# Patient Record
Sex: FEMALE | Race: WHITE
[De-identification: names, ages, dates, MRNs, and addresses within clinical notes are randomized per-mention and may not be internally consistent; named-entity substitution may affect disease eponyms.]

---

## 2017-03-15 ENCOUNTER — HOSPITAL ENCOUNTER (OUTPATIENT)
Age: 62
Discharge: HOME | End: 2017-03-15
Payer: COMMERCIAL

## 2017-03-15 DIAGNOSIS — I10: Primary | ICD-10-CM

## 2017-03-15 DIAGNOSIS — E78.5: ICD-10-CM

## 2017-07-19 ENCOUNTER — HOSPITAL ENCOUNTER (EMERGENCY)
Dept: HOSPITAL 76 - ED | Age: 62
LOS: 1 days | Discharge: HOME | End: 2017-07-20
Payer: COMMERCIAL

## 2017-07-19 DIAGNOSIS — M06.9: ICD-10-CM

## 2017-07-19 DIAGNOSIS — I10: Primary | ICD-10-CM

## 2017-07-19 DIAGNOSIS — G89.18: ICD-10-CM

## 2017-07-19 DIAGNOSIS — G47.30: ICD-10-CM

## 2017-07-19 DIAGNOSIS — K21.9: ICD-10-CM

## 2017-07-19 DIAGNOSIS — Z79.82: ICD-10-CM

## 2017-07-19 PROCEDURE — 99283 EMERGENCY DEPT VISIT LOW MDM: CPT

## 2017-07-19 PROCEDURE — 96372 THER/PROPH/DIAG INJ SC/IM: CPT

## 2017-07-19 NOTE — ED PHYSICIAN DOCUMENTATION
History of Present Illness





- Stated complaint


Stated Complaint: HIGH BLOOD PRESSURE





- Chief complaint


Chief Complaint: General





- History obtained from


History obtained from: Patient





- History of Present Illness


Timing: Today


Pain level now: 7


Improved by: mild, but insufficient, improvement with vicodin, last dose 6:30 

PM tonight.


Worsened by: no apparent exacerbating factors





- Additonal information


Additional information: 





c/o facial pain associated with blepheroplasty and face lift 2 days ago, pain 

has been tenuously controlled with vicodin, at times not adequately controlled 

despite this medication. Tonight, she was having pain that remained 7 (out of 10

) after taking the vicodin. She then took her blood pressure and found it to be 

high, so she took a half-dose of her losartan (in addition to her regular full 

dose earlier tonight). 





Review of Systems


Constitutional: denies: Fever


Eyes: reports: Other (blurry vision; she was told by her surgeon that this is 

not unusual considering the procedure she had)


Cardiac: reports: Reviewed and negative


Respiratory: reports: Reviewed and negative


GI: denies: Abdominal Pain, Nausea, Vomiting


Neurologic: denies: Generalized weakness, Focal weakness, Numbness, Altered 

mental status, Headache (face pain, but not a headache per se)





PD PAST MEDICAL HISTORY





- Past Medical History


Respiratory: Sleep apnea


GI: GERD


Psych: Bipolar disorder


Musculoskeletal: Rheumatoid arthritis





- Past Surgical History


Past Surgical History: Yes


/GYN:  section





- Present Medications


Home Medications: 


 Ambulatory Orders











 Medication  Instructions  Recorded  Confirmed


 


Acyclovir 400 mg ORAL BID 14


 


Aspirin/Acetaminophen/Caffeine 1 tab ORAL DAILY PRN 14





[Excedrin Extra Strength Caplet]   


 


Biotin 10,000 mcg ORAL QPM 14


 


Bupropion HCl [Bupropion Xl] 300 mg ORAL DAILY 14


 


Cholecalciferol (Vitamin D3) 2,000 unit ORAL DAILY 14





[Vitamin D-3]   


 


Clonazepam 0.25 mg ORAL QPM 14


 


FLUoxetine [PROzac] 40 mg ORAL DAILY 14


 


Folic Acid 1 mg ORAL DAILY 14


 


Lamotrigine [Lamictal] 200 mg ORAL QPM 14


 


Methotrexate 15 mg ORAL TITR 14


 


Omeprazole 20 mg ORAL BID 14


 


Vitamin B Complex Vit C No.4 1 tab ORAL QPM 14





[Super B Complex]   


 


HYDROcod/ACETAM 5/325 [Norco 5/325] 1 - 2 tab PO Q6H PRN 17


 


Losartan [Cozaar] 50 mg PO DAILY 17


 


oxyCODONE/ACET 5/325 [Percocet 5 1 - 2 each PO Q6H PRN #14 tablet 17 





mg/325 mg]   














- Allergies


Allergies/Adverse Reactions: 


 Allergies











Allergy/AdvReac Type Severity Reaction Status Date / Time


 


codeine Allergy  Hives Verified 17 23:19


 


lisinopril Allergy  Unknown Verified 17 23:19


 


meperidine HCl * Allergy  Hives Verified 17 23:19





[From Demerol]     


 


serazol Allergy  Hives Uncoded 14 18:25














- Social History


Does the pt smoke?: No


Smoking Status: Never smoker


Does the pt drink ETOH?: No


Does the pt have substance abuse?: No





PD ED PE NORMAL





- Vitals


Vital signs reviewed: Yes





- General


General: Alert and oriented X 3, No acute distress, Well developed/nourished





- HEENT


HEENT: PERRL, EOMI, Other (incision sites (chin, bilateral ears) are C/D/I 

without erythema or discharge)





- Neck


Neck: Supple, no meningeal sign





- Abdomen


Abdomen: Soft, Non tender





- Derm


Derm: Normal color, Warm and dry, No rash





- Neuro


Neuro: Alert and oriented X 3, CNs 2-12 intact, Normal speech





Results





- Vitals


Vitals: 


 Vital Signs - 24 hr











  17





  23:04 01:10


 


Temperature 36.5 C 


 


Heart Rate 106 H 84


 


Respiratory 18 16





Rate  


 


Blood Pressure 186/111 H 161/95 H


 


O2 Saturation 96 96








 Oxygen











O2 Source                      Room air

















PD MEDICAL DECISION MAKING





- ED course


Complexity details: re-evaluated patient, considered differential, d/w patient


ED course: 





Given 1mg IM dilaudid which patient reported, on reevaluation, provided relief 

of her post-operative (facial) pain, but she subsequently developed epigastric 

discomfort. On reexamination, she had no abdominal tenderness to palpation but 

she did appear to be having painful discomfort. Given bentyl PO and oxycodone 

PO. On subsequent reevaluation, she reported feeling much better and was 

comfortable being discharged home.





Departure





- Departure


Disposition: 01 Home, Self Care


Clinical Impression: 


 Post-operative pain, Hypertension


Condition: Good


Instructions:  ED Hypertension Conf Out Of Control, ED Post Op Pain


Follow-Up: 


Teresa Gomez MD [Primary Care Provider] - 


Prescriptions: 


oxyCODONE/ACET 5/325 [Percocet 5 mg/325 mg] 1 - 2 each PO Q6H PRN #14 tablet


 PRN Reason: Pain


Discharge Date/Time: 17 01:26

## 2017-07-20 VITALS — SYSTOLIC BLOOD PRESSURE: 161 MMHG | DIASTOLIC BLOOD PRESSURE: 95 MMHG

## 2018-03-08 ENCOUNTER — HOSPITAL ENCOUNTER (OUTPATIENT)
Dept: HOSPITAL 76 - LAB | Age: 63
Discharge: HOME | End: 2018-03-08
Attending: FAMILY MEDICINE
Payer: COMMERCIAL

## 2018-03-08 DIAGNOSIS — I10: Primary | ICD-10-CM

## 2018-03-08 LAB
ANION GAP SERPL CALCULATED.4IONS-SCNC: 10 MMOL/L (ref 6–13)
BUN SERPL-MCNC: 19 MG/DL (ref 6–20)
CALCIUM UR-MCNC: 9.5 MG/DL (ref 8.5–10.3)
CHLORIDE SERPL-SCNC: 100 MMOL/L (ref 101–111)
CHOLEST SERPL-MCNC: 252 MG/DL
CO2 SERPL-SCNC: 27 MMOL/L (ref 21–32)
CREAT SERPLBLD-SCNC: 0.7 MG/DL (ref 0.4–1)
GFRSERPLBLD MDRD-ARVRAT: 84 ML/MIN/{1.73_M2} (ref 89–?)
GLUCOSE SERPL-MCNC: 102 MG/DL (ref 70–100)
HDLC SERPL-MCNC: 76 MG/DL
HDLC SERPL: 3.3 {RATIO} (ref ?–4.4)
LDLC SERPL CALC-MCNC: 159 MG/DL
LDLC/HDLC SERPL: 2.1 {RATIO} (ref ?–4.4)
SODIUM SERPLBLD-SCNC: 137 MMOL/L (ref 135–145)
VLDLC SERPL-SCNC: 17 MG/DL

## 2018-03-08 PROCEDURE — 80048 BASIC METABOLIC PNL TOTAL CA: CPT

## 2018-03-08 PROCEDURE — 83721 ASSAY OF BLOOD LIPOPROTEIN: CPT

## 2018-03-08 PROCEDURE — 80061 LIPID PANEL: CPT

## 2018-03-08 PROCEDURE — 36415 COLL VENOUS BLD VENIPUNCTURE: CPT

## 2019-04-17 ENCOUNTER — HOSPITAL ENCOUNTER (OUTPATIENT)
Dept: HOSPITAL 76 - LAB | Age: 64
Discharge: HOME | End: 2019-04-17
Attending: FAMILY MEDICINE
Payer: COMMERCIAL

## 2019-04-17 DIAGNOSIS — E78.5: Primary | ICD-10-CM

## 2019-04-17 DIAGNOSIS — I10: ICD-10-CM

## 2019-04-17 DIAGNOSIS — R93.89: ICD-10-CM

## 2019-04-17 LAB
ANION GAP SERPL CALCULATED.4IONS-SCNC: 9 MMOL/L (ref 6–13)
BUN SERPL-MCNC: 25 MG/DL (ref 6–20)
CALCIUM UR-MCNC: 10.1 MG/DL (ref 8.5–10.3)
CHLORIDE SERPL-SCNC: 103 MMOL/L (ref 101–111)
CHOLEST SERPL-MCNC: 243 MG/DL
CO2 SERPL-SCNC: 26 MMOL/L (ref 21–32)
CREAT SERPLBLD-SCNC: 0.8 MG/DL (ref 0.4–1)
GFRSERPLBLD MDRD-ARVRAT: 72 ML/MIN/{1.73_M2} (ref 89–?)
GLUCOSE SERPL-MCNC: 84 MG/DL (ref 70–100)
HDLC SERPL-MCNC: 105 MG/DL
HDLC SERPL: 2.3 {RATIO} (ref ?–4.4)
LDLC SERPL CALC-MCNC: 126 MG/DL
LDLC/HDLC SERPL: 1.2 {RATIO} (ref ?–4.4)
SODIUM SERPLBLD-SCNC: 138 MMOL/L (ref 135–145)
VLDLC SERPL-SCNC: 12 MG/DL

## 2019-04-17 PROCEDURE — 36415 COLL VENOUS BLD VENIPUNCTURE: CPT

## 2019-04-17 PROCEDURE — 80048 BASIC METABOLIC PNL TOTAL CA: CPT

## 2019-04-17 PROCEDURE — 83721 ASSAY OF BLOOD LIPOPROTEIN: CPT

## 2019-04-17 PROCEDURE — 84443 ASSAY THYROID STIM HORMONE: CPT

## 2019-04-17 PROCEDURE — 80061 LIPID PANEL: CPT

## 2019-04-17 PROCEDURE — 84481 FREE ASSAY (FT-3): CPT

## 2020-01-28 ENCOUNTER — HOSPITAL ENCOUNTER (OUTPATIENT)
Dept: HOSPITAL 76 - LAB | Age: 65
Discharge: HOME | End: 2020-01-28
Attending: INTERNAL MEDICINE
Payer: COMMERCIAL

## 2020-01-28 DIAGNOSIS — M05.9: Primary | ICD-10-CM

## 2020-01-28 DIAGNOSIS — Z79.899: ICD-10-CM

## 2020-01-28 LAB
ALBUMIN DIAFP-MCNC: 4.2 G/DL (ref 3.2–5.5)
ALBUMIN/GLOB SERPL: 1.2 {RATIO} (ref 1–2.2)
ALP SERPL-CCNC: 62 IU/L (ref 42–121)
ALT SERPL W P-5'-P-CCNC: 42 IU/L (ref 10–60)
ANION GAP SERPL CALCULATED.4IONS-SCNC: 11 MMOL/L (ref 6–13)
AST SERPL W P-5'-P-CCNC: 34 IU/L (ref 10–42)
BASOPHILS NFR BLD AUTO: 0.1 10^3/UL (ref 0–0.1)
BASOPHILS NFR BLD AUTO: 0.8 %
BILIRUB BLD-MCNC: 0.7 MG/DL (ref 0.2–1)
BUN SERPL-MCNC: 19 MG/DL (ref 6–20)
CALCIUM UR-MCNC: 9.5 MG/DL (ref 8.5–10.3)
CHLORIDE SERPL-SCNC: 101 MMOL/L (ref 101–111)
CO2 SERPL-SCNC: 26 MMOL/L (ref 21–32)
CREAT SERPLBLD-SCNC: 0.9 MG/DL (ref 0.4–1)
CRP SERPL-MCNC: < 1 MG/DL (ref 0–1)
EOSINOPHIL # BLD AUTO: 0.1 10^3/UL (ref 0–0.7)
EOSINOPHIL NFR BLD AUTO: 1.6 %
ERYTHROCYTE [DISTWIDTH] IN BLOOD BY AUTOMATED COUNT: 13.3 % (ref 12–15)
GFRSERPLBLD MDRD-ARVRAT: 63 ML/MIN/{1.73_M2} (ref 89–?)
GLOBULIN SER-MCNC: 3.4 G/DL (ref 2.1–4.2)
GLUCOSE SERPL-MCNC: 97 MG/DL (ref 70–100)
HGB UR QL STRIP: 13 G/DL (ref 12–16)
LYMPHOCYTES # SPEC AUTO: 2.4 10^3/UL (ref 1.5–3.5)
LYMPHOCYTES NFR BLD AUTO: 29.4 %
MCH RBC QN AUTO: 30.6 PG (ref 27–31)
MCHC RBC AUTO-ENTMCNC: 32.5 G/DL (ref 32–36)
MCV RBC AUTO: 94.1 FL (ref 81–99)
MONOCYTES # BLD AUTO: 0.6 10^3/UL (ref 0–1)
MONOCYTES NFR BLD AUTO: 7.6 %
NEUTROPHILS # BLD AUTO: 4.8 10^3/UL (ref 1.5–6.6)
NEUTROPHILS # SNV AUTO: 8 X10^3/UL (ref 4.8–10.8)
NEUTROPHILS NFR BLD AUTO: 60.2 %
PDW BLD AUTO: 8.9 FL (ref 7.9–10.8)
PLATELET # BLD: 282 10^3/UL (ref 130–450)
PROT SPEC-MCNC: 7.6 G/DL (ref 6.7–8.2)
RBC MAR: 4.25 10^6/UL (ref 4.2–5.4)
SODIUM SERPLBLD-SCNC: 138 MMOL/L (ref 135–145)

## 2020-01-28 PROCEDURE — 86140 C-REACTIVE PROTEIN: CPT

## 2020-01-28 PROCEDURE — 36415 COLL VENOUS BLD VENIPUNCTURE: CPT

## 2020-01-28 PROCEDURE — 85025 COMPLETE CBC W/AUTO DIFF WBC: CPT

## 2020-01-28 PROCEDURE — 80053 COMPREHEN METABOLIC PANEL: CPT

## 2020-06-11 ENCOUNTER — HOSPITAL ENCOUNTER (EMERGENCY)
Dept: HOSPITAL 76 - ED | Age: 65
Discharge: HOME | End: 2020-06-11
Payer: COMMERCIAL

## 2020-06-11 VITALS — DIASTOLIC BLOOD PRESSURE: 73 MMHG | SYSTOLIC BLOOD PRESSURE: 146 MMHG

## 2020-06-11 DIAGNOSIS — M79.10: ICD-10-CM

## 2020-06-11 DIAGNOSIS — Z20.828: ICD-10-CM

## 2020-06-11 DIAGNOSIS — R50.9: Primary | ICD-10-CM

## 2020-06-11 DIAGNOSIS — M06.9: ICD-10-CM

## 2020-06-11 DIAGNOSIS — J84.10: ICD-10-CM

## 2020-06-11 DIAGNOSIS — Z79.82: ICD-10-CM

## 2020-06-11 DIAGNOSIS — I10: ICD-10-CM

## 2020-06-11 DIAGNOSIS — R00.0: ICD-10-CM

## 2020-06-11 LAB
ALBUMIN DIAFP-MCNC: 4 G/DL (ref 3.2–5.5)
ALBUMIN/GLOB SERPL: 1.1 {RATIO} (ref 1–2.2)
ALP SERPL-CCNC: 72 IU/L (ref 42–121)
ALT SERPL W P-5'-P-CCNC: 23 IU/L (ref 10–60)
ANION GAP SERPL CALCULATED.4IONS-SCNC: 9 MMOL/L (ref 6–13)
AST SERPL W P-5'-P-CCNC: 21 IU/L (ref 10–42)
BASOPHILS NFR BLD AUTO: 0.1 10^3/UL (ref 0–0.1)
BASOPHILS NFR BLD AUTO: 0.5 %
BILIRUB BLD-MCNC: 0.8 MG/DL (ref 0.2–1)
BUN SERPL-MCNC: 22 MG/DL (ref 6–20)
CALCIUM UR-MCNC: 9.7 MG/DL (ref 8.5–10.3)
CHLORIDE SERPL-SCNC: 101 MMOL/L (ref 101–111)
CLARITY UR REFRACT.AUTO: CLEAR
CO2 SERPL-SCNC: 25 MMOL/L (ref 21–32)
CREAT SERPLBLD-SCNC: 0.8 MG/DL (ref 0.4–1)
EOSINOPHIL # BLD AUTO: 0 10^3/UL (ref 0–0.7)
EOSINOPHIL NFR BLD AUTO: 0.1 %
ERYTHROCYTE [DISTWIDTH] IN BLOOD BY AUTOMATED COUNT: 13.5 % (ref 12–15)
GLOBULIN SER-MCNC: 3.6 G/DL (ref 2.1–4.2)
GLUCOSE SERPL-MCNC: 116 MG/DL (ref 70–100)
GLUCOSE UR QL STRIP.AUTO: NEGATIVE MG/DL
HGB UR QL STRIP: 12.2 G/DL (ref 12–16)
KETONES UR QL STRIP.AUTO: NEGATIVE MG/DL
LIPASE SERPL-CCNC: 32 U/L (ref 22–51)
LYMPHOCYTES # SPEC AUTO: 2.1 10^3/UL (ref 1.5–3.5)
LYMPHOCYTES NFR BLD AUTO: 17.6 %
MCH RBC QN AUTO: 31.3 PG (ref 27–31)
MCHC RBC AUTO-ENTMCNC: 33.5 G/DL (ref 32–36)
MCV RBC AUTO: 93.3 FL (ref 81–99)
MONOCYTES # BLD AUTO: 1.2 10^3/UL (ref 0–1)
MONOCYTES NFR BLD AUTO: 10.5 %
NEUTROPHILS # BLD AUTO: 8.3 10^3/UL (ref 1.5–6.6)
NEUTROPHILS # SNV AUTO: 11.7 X10^3/UL (ref 4.8–10.8)
NEUTROPHILS NFR BLD AUTO: 70.9 %
NITRITE UR QL STRIP.AUTO: NEGATIVE
PDW BLD AUTO: 9.4 FL (ref 7.9–10.8)
PH UR STRIP.AUTO: 8.5 PH (ref 5–7.5)
PLATELET # BLD: 285 10^3/UL (ref 130–450)
PROT SPEC-MCNC: 7.6 G/DL (ref 6.7–8.2)
PROT UR STRIP.AUTO-MCNC: NEGATIVE MG/DL
RBC # UR STRIP.AUTO: NEGATIVE /UL
RBC MAR: 3.9 10^6/UL (ref 4.2–5.4)
SODIUM SERPLBLD-SCNC: 135 MMOL/L (ref 135–145)
SP GR UR STRIP.AUTO: 1.01 (ref 1–1.03)
UROBILINOGEN UR QL STRIP.AUTO: (no result) E.U./DL
UROBILINOGEN UR STRIP.AUTO-MCNC: NEGATIVE MG/DL

## 2020-06-11 PROCEDURE — 99283 EMERGENCY DEPT VISIT LOW MDM: CPT

## 2020-06-11 PROCEDURE — 36415 COLL VENOUS BLD VENIPUNCTURE: CPT

## 2020-06-11 PROCEDURE — 85025 COMPLETE CBC W/AUTO DIFF WBC: CPT

## 2020-06-11 PROCEDURE — 84443 ASSAY THYROID STIM HORMONE: CPT

## 2020-06-11 PROCEDURE — 96360 HYDRATION IV INFUSION INIT: CPT

## 2020-06-11 PROCEDURE — 84484 ASSAY OF TROPONIN QUANT: CPT

## 2020-06-11 PROCEDURE — 83605 ASSAY OF LACTIC ACID: CPT

## 2020-06-11 PROCEDURE — 83690 ASSAY OF LIPASE: CPT

## 2020-06-11 PROCEDURE — 81001 URINALYSIS AUTO W/SCOPE: CPT

## 2020-06-11 PROCEDURE — 87040 BLOOD CULTURE FOR BACTERIA: CPT

## 2020-06-11 PROCEDURE — 80053 COMPREHEN METABOLIC PANEL: CPT

## 2020-06-11 PROCEDURE — 87086 URINE CULTURE/COLONY COUNT: CPT

## 2020-06-11 PROCEDURE — 81003 URINALYSIS AUTO W/O SCOPE: CPT

## 2020-06-11 PROCEDURE — 71045 X-RAY EXAM CHEST 1 VIEW: CPT

## 2020-06-11 PROCEDURE — 81599 UNLISTED MAAA: CPT

## 2020-06-11 PROCEDURE — 93005 ELECTROCARDIOGRAM TRACING: CPT

## 2020-06-11 NOTE — XRAY REPORT
PROCEDURE:  Chest 1 View X-Ray

 

INDICATIONS:  chest pain

 

TECHNIQUE:  One view of the chest was acquired.  

 

COMPARISON:  12/31/2014

 

FINDINGS:  

 

Surgical changes and devices:  None.  

 

Lungs and pleura:  No pleural effusions or pneumothorax.  Lungs are clear.  

 

Mediastinum:  Mediastinal contours appear normal.  Heart size is normal.  

 

Bones and chest wall:  No suspicious bony lesions.  Overlying soft tissues appear unremarkable.  

 

IMPRESSION:  

No evidence acute pulmonary process.

 

Reviewed by: Billy Cannon MD on 6/11/2020 7:50 PM PDT

Approved by: Billy Cannon MD on 6/11/2020 7:50 PM PDT

 

 

Station ID:  [id number]

## 2020-06-11 NOTE — ED PHYSICIAN DOCUMENTATION
History of Present Illness





- Stated complaint


Stated Complaint: CHILLS, DIZZY, FEVER





- Chief complaint


Chief Complaint: Fever





- History obtained from


History obtained from: Patient, Family





- History of Present Illness


Timing: Prior to arrival, How many hours ago (5)





- Additonal information


Additional information: 





64-year-old female presents to the emergency department with acute onset fevers 

chills and a sensation of feeling dizzy or light headed.  Began about 5 hours 

ago.  She reports a chronic cough secondary to her pulmonary fibrosis (secondary

to RA) but no change in quality of cough.  Her cough is not productive.





She denies any headache any focal weakness.  She denies abdominal pain, dysuria,

urgency, or frequency. no vomiting or diarrhea. No rashes sores or lesions. She 

is accompanied by her spouse who feels well but has traveled recently.





PMH:  HTN, RA, pulmonary fibrosis


MEDS:  prozac, wellbutrin, lamictil. losartan, simvastatin, methotrexate, 

simponi


SOC:  non smoker; denies ETOH





  





Review of Systems


Constitutional: reports: Fever, Chills.  denies: Fatigue, Weight Loss, Sweats


Eyes: denies: Loss of vision


Ears: denies: Loss of hearing


Nose: denies: Rhinorrhea / runny nose


Cardiac: denies: Chest pain / pressure, Palpitations, Pedal edema, Calf pain


Respiratory: reports: Cough (chronic).  denies: Dyspnea, Hemoptysis, Wheezing


GI: denies: Nausea, Vomiting


: denies: Dysuria


Skin: denies: Rash, Lesions


Musculoskeletal: denies: Neck pain, Back pain


Neurologic: denies: Generalized weakness, Focal weakness, Numbness, Difficulty 

speaking


Immunocompromised: reports: Immunocompromised (RA on methotrexate)





PD PAST MEDICAL HISTORY





- Past Medical History


Cardiovascular: Hypertension


Respiratory: Sleep apnea, Other (pulmonary fibrosis)


Endocrine/Autoimmune: None, Other (rheumatoid arthritis)


GI: GERD


Psych: Bipolar disorder


Musculoskeletal: Rheumatoid arthritis





- Past Surgical History


Past Surgical History: Yes


/GYN:  section





- Present Medications


Home Medications: 


                                Ambulatory Orders











 Medication  Instructions  Recorded  Confirmed


 


Acyclovir 400 mg ORAL BID 14


 


Aspirin/Acetaminophen/Caffeine 1 tab ORAL DAILY PRN 14





[Excedrin Extra Strength Caplet]   


 


Biotin 10,000 mcg ORAL QPM 14


 


Bupropion HCl [Bupropion Xl] 300 mg ORAL DAILY 14


 


Cholecalciferol (Vitamin D3) 2,000 unit ORAL DAILY 14





[Vitamin D-3]   


 


Clonazepam 0.25 mg ORAL QPM 14


 


FLUoxetine [PROzac] 40 mg ORAL DAILY 14


 


Folic Acid 1 mg ORAL DAILY 14


 


Methotrexate 15 mg ORAL TITR 14


 


Omeprazole 20 mg ORAL BID 14


 


Vitamin B Complex Vit C No.4 1 tab ORAL QPM 14





[Super B Complex]   


 


lamoTRIgine [Lamictal] 200 mg ORAL QPM 14


 


HYDROcod/ACETAM 5/325 [Norco 5/325] 1 - 2 tab PO Q6H PRN 17


 


Losartan [Cozaar] 50 mg PO DAILY 17


 


oxyCODONE/ACET 5/325 [Percocet 5 1 - 2 each PO Q6H PRN #14 tablet 17 





mg/325 mg]   














- Allergies


Allergies/Adverse Reactions: 


                                    Allergies











Allergy/AdvReac Type Severity Reaction Status Date / Time


 


codeine Allergy  Hives Verified 20 18:48


 


lisinopril Allergy  Unknown Verified 20 18:48


 


meperidine HCl * Allergy  Hives Verified 20 18:48





[From Demerol]     


 


serazol Allergy  Hives Uncoded 20 18:48














- Social History


Does the pt smoke?: No


Smoking Status: Never smoker


Does the pt drink ETOH?: No


Does the pt have substance abuse?: No





PD ED PE EXPANDED





- General


General: Alert, No acute distress, Well developed/nourished, Anxious





- HEENT


HEENT: Atraumatic, PERRL, EOMI





- Eyes


Eyes: PERRL





- Neck


Neck: Supple w/out meningeal sx, No tenderness.  No: Adenopathy





- Cardiac


Cardiac: Tachy, Normal pulses, Radial strong equal, Cap refill < 2 sec.  No: 

Murmur Present





- Respiratory


Respiratory: Clear to ausultation patricia.  No: Distress, Labored





- Abdomen


Abdomen: Normal Bowel sounds.  No: Tender to palpation





- Derm


Derm: Normal color, Warm and dry.  No: Purpura, Emboli, Bruising





- Neuro


Neuro: Alert and Oriented X 3, CNII-XII intact, Cerebellar nl, Normal gait.  No:

Weakness





- GCS


Eye Opening: Spontaneous


Motor: Obeys Commands


Verbal: Oriented


Total: 15





- Psych


Psych: Normal





Results





- Vitals


Vitals: 


                               Vital Signs - 24 hr











  20





  18:49 19:23 19:51


 


Temperature 38.0 C H 38.2 C H 


 


Heart Rate 129 H  100


 


Respiratory 16  21





Rate   


 


Blood Pressure 160/87 H  135/73 H


 


O2 Saturation 94  97








                                     Oxygen











O2 Source                      Room air

















- EKG (time done)


  ** 1926


Rate: Rate (enter#) (105)


Rhythm: Sinus tachycardia


Axis: Normal


Intervals: Normal TN


QRS: Normal


Ischemia: Non specific changes, Other (t wave flattening anterior leads)


Compare to prior EKG: Old EKG unavailable


Computer interpretation: Agree with computer





- Labs


Labs: 


                                Laboratory Tests











  20





  19:09 19:11 19:11


 


WBC   11.7 H 


 


RBC   3.90 L 


 


Hgb   12.2 


 


Hct   36.4 L 


 


MCV   93.3 


 


MCH   31.3 H 


 


MCHC   33.5 


 


RDW   13.5 


 


Plt Count   285 


 


MPV   9.4 


 


Neut # (Auto)   8.3 H 


 


Lymph # (Auto)   2.1 


 


Mono # (Auto)   1.2 H 


 


Eos # (Auto)   0.0 


 


Baso # (Auto)   0.1 


 


Absolute Nucleated RBC   0.00 


 


Nucleated RBC %   0.0 


 


Sodium    135


 


Potassium    3.8


 


Chloride    101


 


Carbon Dioxide    25


 


Anion Gap    9.0


 


BUN    22 H


 


Creatinine    0.8


 


Estimated GFR (MDRD)    72 L


 


Glucose    116 H


 


Lactic Acid   


 


Calcium    9.7


 


Total Bilirubin    0.8


 


AST    21


 


ALT    23


 


Alkaline Phosphatase    72


 


Troponin I High Sens   


 


Total Protein    7.6


 


Albumin    4.0


 


Globulin    3.6


 


Albumin/Globulin Ratio    1.1


 


Lipase    32


 


TSH   


 


Urine Color  YELLOW  


 


Urine Clarity  CLEAR  


 


Urine pH  8.5 H  


 


Ur Specific Gravity  1.015  


 


Urine Protein  NEGATIVE  


 


Urine Glucose (UA)  NEGATIVE  


 


Urine Ketones  NEGATIVE  


 


Urine Occult Blood  NEGATIVE  


 


Urine Nitrite  NEGATIVE  


 


Urine Bilirubin  NEGATIVE  


 


Urine Urobilinogen  0.2 (NORMAL)  


 


Ur Leukocyte Esterase  NEGATIVE  


 


Ur Microscopic Review  NOT INDICATED  


 


Urine Culture Comments  NOT INDICATED  














  20





  19:11 19:11 19:11


 


WBC   


 


RBC   


 


Hgb   


 


Hct   


 


MCV   


 


MCH   


 


MCHC   


 


RDW   


 


Plt Count   


 


MPV   


 


Neut # (Auto)   


 


Lymph # (Auto)   


 


Mono # (Auto)   


 


Eos # (Auto)   


 


Baso # (Auto)   


 


Absolute Nucleated RBC   


 


Nucleated RBC %   


 


Sodium   


 


Potassium   


 


Chloride   


 


Carbon Dioxide   


 


Anion Gap   


 


BUN   


 


Creatinine   


 


Estimated GFR (MDRD)   


 


Glucose   


 


Lactic Acid  0.7  


 


Calcium   


 


Total Bilirubin   


 


AST   


 


ALT   


 


Alkaline Phosphatase   


 


Troponin I High Sens    6.1


 


Total Protein   


 


Albumin   


 


Globulin   


 


Albumin/Globulin Ratio   


 


Lipase   


 


TSH   0.78 


 


Urine Color   


 


Urine Clarity   


 


Urine pH   


 


Ur Specific Gravity   


 


Urine Protein   


 


Urine Glucose (UA)   


 


Urine Ketones   


 


Urine Occult Blood   


 


Urine Nitrite   


 


Urine Bilirubin   


 


Urine Urobilinogen   


 


Ur Leukocyte Esterase   


 


Ur Microscopic Review   


 


Urine Culture Comments   














- Rads (name of study)


  ** cxr


Radiology: Final report received (No acute cardiopulmonary abnormality.)





PD MEDICAL DECISION MAKING





- ED course


Complexity details: reviewed results, re-evaluated patient, considered 

differential, d/w patient, d/w family


ED course: 





This is a 64-year-old female who presents to the emergency department with acute

onset of myalgias, chills, and fever that began about 5 hours prior to arrival. 

Patient is a higher risk category secondary to history of rheumatoid arthritis 

and immune modulating medications.


- COVID-19 screening is pending


- Patient's urine was evaluated for markers of infection and there does not 

appear to be any acute infection.  She also lacks urinary symptoms.  


- Blood cultures are pending.  


- Her labs were otherwise reviewed in full detail.  Lactic acid is negative and 

her white blood cell count is not markedly elevated.  Chest x-ray was reviewed 

by me and the radiologist and there is no findings consistent with acute 

pneumonia.  Patient has a baseline cough that has not changed in quality or 

character and does have a known history of pulmonary fibrosis.


- Patient was given 1 L of IV fluids in the emergency department with marked 

improvement in her heart rate and she felt markedly improved.  Her blood 

pressure has remained stable. 


- Patient's EKG was reviewed it is nonischemic.  Her troponin is negative she 

denies chest pain or dyspnea. 


- After lengthy discussion with the patient and her  she feels ready for 

discharge home.  Symptoms are most likely an acute viral etiology. We will defer

antibiotics at this juncture.  However it was discussed with patient that 

because the etiology of her fever is not yet understood if her symptoms are 

worsening she has any chest pain or dyspnea she is to return immediately to the 

emergency department for further evaluation.  She is also to remain in 

quarantine until the results of her COVID-19 testing are known.





Departure





- Departure


Disposition:  Home, Self Care


Clinical Impression: 


 Myalgia





Fever


Qualifiers:


 Fever type: unspecified Qualified Code(s): R50.9 - Fever, unspecified





Condition: Stable


Record reviewed to determine appropriate education?: Yes


Instructions:  ED Fever Unconf Cause


Follow-Up: 


LILIYA MORENO MD [Primary Care Provider] - Within 1 week


Comments: 


Destiny hope that you continue to feel better.  Your chest x-ray does not show a 

pneumonia.  Your lab work is all very reassuring today.  You may have a viral 

illness causing your fever and chills.  We have tested you for COVID-19.  We 

will have the results back in the next 48 hours.  Is important you remain in 

quarantine until we know those results.





Because we do not yet know the cause of your fever if you feel that you are not 

improving at home have any chest pain or shortness of breath or feel that your 

symptoms are worsening in any way please return to the emerge C department for 

reevaluation

## 2020-09-13 ENCOUNTER — HOSPITAL ENCOUNTER (EMERGENCY)
Dept: HOSPITAL 76 - ED | Age: 65
Discharge: HOME | End: 2020-09-13
Payer: COMMERCIAL

## 2020-09-13 VITALS — DIASTOLIC BLOOD PRESSURE: 72 MMHG | SYSTOLIC BLOOD PRESSURE: 138 MMHG

## 2020-09-13 DIAGNOSIS — I10: ICD-10-CM

## 2020-09-13 DIAGNOSIS — S68.116A: Primary | ICD-10-CM

## 2020-09-13 DIAGNOSIS — Z23: ICD-10-CM

## 2020-09-13 DIAGNOSIS — M06.9: ICD-10-CM
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
## 2020-09-13 PROCEDURE — 99282 EMERGENCY DEPT VISIT SF MDM: CPT

## 2020-09-13 PROCEDURE — 90471 IMMUNIZATION ADMIN: CPT

## 2020-09-13 PROCEDURE — 99283 EMERGENCY DEPT VISIT LOW MDM: CPT

## 2020-09-13 NOTE — ED PHYSICIAN DOCUMENTATION
PD HPI UPPER EXT INJURY





- Stated complaint


Stated Complaint: LT FINGER LAC





- Chief complaint


Chief Complaint: Laceration





- History obtained from


History obtained from: Patient (Right-handed woman cut the tip of her left pinky

finger with a knife at home just prior to arrival.  Pain is moderate.  No other 

injuries.  Tetanus is unknown.)





Review of Systems


Constitutional: reports: Reviewed and negative


Eyes: reports: Reviewed and negative


Ears: reports: Reviewed and negative





PD PAST MEDICAL HISTORY





- Past Medical History


Past Medical History: Yes


Cardiovascular: Hypertension


Respiratory: Sleep apnea, Other


Endocrine/Autoimmune: None, Other


GI: GERD


Psych: Bipolar disorder


Musculoskeletal: Rheumatoid arthritis





- Past Surgical History


Past Surgical History: Yes


/GYN:  section





- Present Medications


Home Medications: 


                                Ambulatory Orders











 Medication  Instructions  Recorded  Confirmed


 


Acyclovir 400 mg ORAL BID 14


 


Aspirin/Acetaminophen/Caffeine 1 tab ORAL DAILY PRN 14





[Excedrin Extra Strength Caplet]   


 


Biotin 10,000 mcg ORAL QPM 14


 


Bupropion HCl [Bupropion Xl] 300 mg ORAL DAILY 14


 


Cholecalciferol (Vitamin D3) 2,000 unit ORAL DAILY 14





[Vitamin D-3]   


 


Clonazepam 0.25 mg ORAL QPM 14


 


FLUoxetine [PROzac] 40 mg ORAL DAILY 14


 


Folic Acid 1 mg ORAL DAILY 14


 


Methotrexate 15 mg ORAL TITR 14


 


Omeprazole 20 mg ORAL BID 14


 


Vitamin B Complex Vit C No.4 1 tab ORAL QPM 14





[Super B Complex]   


 


lamoTRIgine [Lamictal] 200 mg ORAL QPM 14


 


HYDROcod/ACETAM 5/325 [Norco 5/325] 1 - 2 tab PO Q6H PRN 17


 


Losartan [Cozaar] 50 mg PO DAILY 17


 


oxyCODONE/ACET 5/325 [Percocet 5 1 - 2 each PO Q6H PRN #14 tablet 17 





mg/325 mg]   














- Allergies


Allergies/Adverse Reactions: 


                                    Allergies











Allergy/AdvReac Type Severity Reaction Status Date / Time


 


codeine Allergy  Hives Verified 20 17:45


 


lisinopril Allergy  Unknown Verified 20 17:45


 


meperidine HCl * Allergy  Hives Verified 20 17:45





[From Demerol]     


 


serazol Allergy  Hives Uncoded 20 18:48














- Social History


Does the pt smoke?: No


Smoking Status: Never smoker


Does the pt drink ETOH?: No


Does the pt have substance abuse?: No





- Immunizations


Immunizations: TDAP >10years/unknown





PD ED PE NORMAL





- Vitals


Vital signs reviewed: Yes





- General


General: Alert and oriented X 3, No acute distress





- Extremities


Extremities: Other (She has a less than 1 cm tissue loss at the tip of the left

fourth finger)





- Neuro


Neuro: Alert and oriented X 3





- Psych


Psych: Normal mood, Normal affect





Results





- Vitals


Vitals: 





                               Vital Signs - 24 hr











  20





  17:45 18:01


 


Temperature 36.8 C 


 


Heart Rate 107 H 107 H


 


Respiratory 16 16





Rate  


 


Blood Pressure 144/79 H 144/79 H


 


O2 Saturation 100 100








                                     Oxygen











O2 Source                      Room air

















PD MEDICAL DECISION MAKING





- ED course


ED course: 





The wound was irrigated and dressed with Surgicel and a dressing.  She was 

counseled on wound care.





Departure





- Departure


Disposition: 01 Home, Self Care


Clinical Impression: 


Fingertip amputation


Qualifiers:


 Encounter type: initial encounter Qualified Code(s): S68.119A - Complete 

traumatic metacarpophalangeal amputation of unspecified finger, initial 

encounter





Condition: Good


Record reviewed to determine appropriate education?: Yes


Instructions:  ED Laceration Amputation Finger Tip Open Tx


Comments: 


Keep the current dressing on for 48 hours, after which time you can take it off 

and wash it briefly with soap and water and use a Band-Aid and then use the 

splint over that that we gave you.  You will likely need to continue this for 

several weeks until the skin heals in which I expect it to do fully.  Follow-up 

with your doctor for a wound check in about a week.

## 2021-06-04 ENCOUNTER — HOSPITAL ENCOUNTER (OUTPATIENT)
Dept: HOSPITAL 76 - LAB | Age: 66
Discharge: HOME | End: 2021-06-04
Attending: INTERNAL MEDICINE
Payer: MEDICARE

## 2021-06-04 DIAGNOSIS — Z79.899: ICD-10-CM

## 2021-06-04 DIAGNOSIS — M05.10: Primary | ICD-10-CM

## 2021-06-04 LAB
ALBUMIN DIAFP-MCNC: 3.9 G/DL (ref 3.2–5.5)
ALBUMIN/GLOB SERPL: 1.3 {RATIO} (ref 1–2.2)
ALP SERPL-CCNC: 78 IU/L (ref 42–121)
ALT SERPL W P-5'-P-CCNC: 49 IU/L (ref 10–60)
ANION GAP SERPL CALCULATED.4IONS-SCNC: 11 MMOL/L (ref 6–13)
AST SERPL W P-5'-P-CCNC: 38 IU/L (ref 10–42)
BILIRUB BLD-MCNC: 0.6 MG/DL (ref 0.2–1)
BUN SERPL-MCNC: 15 MG/DL (ref 6–20)
CALCIUM UR-MCNC: 9.8 MG/DL (ref 8.5–10.3)
CHLORIDE SERPL-SCNC: 102 MMOL/L (ref 101–111)
CO2 SERPL-SCNC: 26 MMOL/L (ref 21–32)
CREAT SERPLBLD-SCNC: 1 MG/DL (ref 0.4–1)
ERYTHROCYTE [DISTWIDTH] IN BLOOD BY AUTOMATED COUNT: 15.9 % (ref 12–15)
GFRSERPLBLD MDRD-ARVRAT: 55 ML/MIN/{1.73_M2} (ref 89–?)
GLOBULIN SER-MCNC: 2.9 G/DL (ref 2.1–4.2)
GLUCOSE SERPL-MCNC: 97 MG/DL (ref 70–100)
HCT VFR BLD AUTO: 36.7 % (ref 37–47)
HGB UR QL STRIP: 12 G/DL (ref 12–16)
MCH RBC QN AUTO: 30 PG (ref 27–31)
MCHC RBC AUTO-ENTMCNC: 32.7 G/DL (ref 32–36)
MCV RBC AUTO: 91.8 FL (ref 81–99)
NEUTROPHILS # SNV AUTO: 7 X10^3/UL (ref 4.8–10.8)
PDW BLD AUTO: 9 FL (ref 7.9–10.8)
PLATELET # BLD: 358 10^3/UL (ref 130–450)
POTASSIUM SERPL-SCNC: 3.8 MMOL/L (ref 3.5–5)
PROT SPEC-MCNC: 6.8 G/DL (ref 6.7–8.2)
RBC MAR: 4 10^6/UL (ref 4.2–5.4)
SODIUM SERPLBLD-SCNC: 139 MMOL/L (ref 135–145)

## 2021-06-04 PROCEDURE — 36415 COLL VENOUS BLD VENIPUNCTURE: CPT

## 2021-06-04 PROCEDURE — 85027 COMPLETE CBC AUTOMATED: CPT

## 2021-06-04 PROCEDURE — 80053 COMPREHEN METABOLIC PANEL: CPT

## 2021-06-11 ENCOUNTER — HOSPITAL ENCOUNTER (OUTPATIENT)
Dept: HOSPITAL 76 - LAB | Age: 66
Discharge: HOME | End: 2021-06-11
Attending: INTERNAL MEDICINE
Payer: MEDICARE

## 2021-06-11 DIAGNOSIS — M05.10: Primary | ICD-10-CM

## 2021-06-11 DIAGNOSIS — Z79.899: ICD-10-CM

## 2021-06-11 LAB
ALBUMIN DIAFP-MCNC: 4.2 G/DL (ref 3.2–5.5)
ALBUMIN/GLOB SERPL: 1.6 {RATIO} (ref 1–2.2)
ALP SERPL-CCNC: 76 IU/L (ref 42–121)
ALT SERPL W P-5'-P-CCNC: 54 IU/L (ref 10–60)
ANION GAP SERPL CALCULATED.4IONS-SCNC: 7 MMOL/L (ref 6–13)
AST SERPL W P-5'-P-CCNC: 40 IU/L (ref 10–42)
BILIRUB BLD-MCNC: 0.4 MG/DL (ref 0.2–1)
BUN SERPL-MCNC: 20 MG/DL (ref 6–20)
CALCIUM UR-MCNC: 9.6 MG/DL (ref 8.5–10.3)
CHLORIDE SERPL-SCNC: 105 MMOL/L (ref 101–111)
CO2 SERPL-SCNC: 26 MMOL/L (ref 21–32)
CREAT SERPLBLD-SCNC: 0.8 MG/DL (ref 0.4–1)
ERYTHROCYTE [DISTWIDTH] IN BLOOD BY AUTOMATED COUNT: 16.3 % (ref 12–15)
GFRSERPLBLD MDRD-ARVRAT: 72 ML/MIN/{1.73_M2} (ref 89–?)
GLOBULIN SER-MCNC: 2.6 G/DL (ref 2.1–4.2)
GLUCOSE SERPL-MCNC: 95 MG/DL (ref 70–100)
HCT VFR BLD AUTO: 39.2 % (ref 37–47)
HGB UR QL STRIP: 12 G/DL (ref 12–16)
MCH RBC QN AUTO: 29.1 PG (ref 27–31)
MCHC RBC AUTO-ENTMCNC: 30.6 G/DL (ref 32–36)
MCV RBC AUTO: 94.9 FL (ref 81–99)
NEUTROPHILS # SNV AUTO: 10.4 X10^3/UL (ref 4.8–10.8)
PDW BLD AUTO: 9.4 FL (ref 7.9–10.8)
PLATELET # BLD: 381 10^3/UL (ref 130–450)
POTASSIUM SERPL-SCNC: 3.6 MMOL/L (ref 3.5–5)
PROT SPEC-MCNC: 6.8 G/DL (ref 6.7–8.2)
RBC MAR: 4.13 10^6/UL (ref 4.2–5.4)
SODIUM SERPLBLD-SCNC: 138 MMOL/L (ref 135–145)

## 2021-06-11 PROCEDURE — 80053 COMPREHEN METABOLIC PANEL: CPT

## 2021-06-11 PROCEDURE — 36415 COLL VENOUS BLD VENIPUNCTURE: CPT

## 2021-06-11 PROCEDURE — 85027 COMPLETE CBC AUTOMATED: CPT

## 2021-06-14 ENCOUNTER — HOSPITAL ENCOUNTER (OUTPATIENT)
Dept: HOSPITAL 76 - COV | Age: 66
Discharge: HOME | End: 2021-06-14
Attending: INTERNAL MEDICINE
Payer: MEDICARE

## 2021-06-14 DIAGNOSIS — Z20.822: ICD-10-CM

## 2021-06-14 DIAGNOSIS — Z01.812: Primary | ICD-10-CM

## 2021-06-17 ENCOUNTER — HOSPITAL ENCOUNTER (OUTPATIENT)
Dept: HOSPITAL 76 - LAB | Age: 66
Discharge: HOME | End: 2021-06-17
Attending: INTERNAL MEDICINE
Payer: MEDICARE

## 2021-06-17 DIAGNOSIS — M05.10: Primary | ICD-10-CM

## 2021-06-17 DIAGNOSIS — Z79.899: ICD-10-CM

## 2021-06-17 LAB
ALBUMIN DIAFP-MCNC: 4.2 G/DL (ref 3.2–5.5)
ALBUMIN/GLOB SERPL: 1.8 {RATIO} (ref 1–2.2)
ALP SERPL-CCNC: 82 IU/L (ref 42–121)
ALT SERPL W P-5'-P-CCNC: 46 IU/L (ref 10–60)
ANION GAP SERPL CALCULATED.4IONS-SCNC: 7 MMOL/L (ref 6–13)
AST SERPL W P-5'-P-CCNC: 37 IU/L (ref 10–42)
BILIRUB BLD-MCNC: 0.5 MG/DL (ref 0.2–1)
BUN SERPL-MCNC: 19 MG/DL (ref 6–20)
CALCIUM UR-MCNC: 9.3 MG/DL (ref 8.5–10.3)
CHLORIDE SERPL-SCNC: 101 MMOL/L (ref 101–111)
CO2 SERPL-SCNC: 25 MMOL/L (ref 21–32)
CREAT SERPLBLD-SCNC: 1 MG/DL (ref 0.4–1)
ERYTHROCYTE [DISTWIDTH] IN BLOOD BY AUTOMATED COUNT: 16 % (ref 12–15)
GFRSERPLBLD MDRD-ARVRAT: 55 ML/MIN/{1.73_M2} (ref 89–?)
GLOBULIN SER-MCNC: 2.3 G/DL (ref 2.1–4.2)
GLUCOSE SERPL-MCNC: 107 MG/DL (ref 70–100)
HCT VFR BLD AUTO: 35 % (ref 37–47)
HGB UR QL STRIP: 11.3 G/DL (ref 12–16)
MCH RBC QN AUTO: 30 PG (ref 27–31)
MCHC RBC AUTO-ENTMCNC: 32.3 G/DL (ref 32–36)
MCV RBC AUTO: 92.8 FL (ref 81–99)
NEUTROPHILS # SNV AUTO: 8.5 X10^3/UL (ref 4.8–10.8)
PDW BLD AUTO: 9.1 FL (ref 7.9–10.8)
PLATELET # BLD: 295 10^3/UL (ref 130–450)
POTASSIUM SERPL-SCNC: 3.8 MMOL/L (ref 3.5–5)
PROT SPEC-MCNC: 6.5 G/DL (ref 6.7–8.2)
RBC MAR: 3.77 10^6/UL (ref 4.2–5.4)
SODIUM SERPLBLD-SCNC: 133 MMOL/L (ref 135–145)

## 2021-06-17 PROCEDURE — 85027 COMPLETE CBC AUTOMATED: CPT

## 2021-06-17 PROCEDURE — 80053 COMPREHEN METABOLIC PANEL: CPT

## 2021-06-17 PROCEDURE — 36415 COLL VENOUS BLD VENIPUNCTURE: CPT

## 2021-06-25 ENCOUNTER — HOSPITAL ENCOUNTER (OUTPATIENT)
Dept: HOSPITAL 76 - LAB | Age: 66
Discharge: HOME | End: 2021-06-25
Attending: INTERNAL MEDICINE
Payer: MEDICARE

## 2021-06-25 DIAGNOSIS — Z79.899: ICD-10-CM

## 2021-06-25 DIAGNOSIS — M05.10: Primary | ICD-10-CM

## 2021-06-25 LAB
ALBUMIN DIAFP-MCNC: 3.7 G/DL (ref 3.2–5.5)
ALBUMIN/GLOB SERPL: 1.2 {RATIO} (ref 1–2.2)
ALP SERPL-CCNC: 78 IU/L (ref 42–121)
ALT SERPL W P-5'-P-CCNC: 28 IU/L (ref 10–60)
ANION GAP SERPL CALCULATED.4IONS-SCNC: 9 MMOL/L (ref 6–13)
AST SERPL W P-5'-P-CCNC: 21 IU/L (ref 10–42)
BILIRUB BLD-MCNC: 0.5 MG/DL (ref 0.2–1)
BUN SERPL-MCNC: 24 MG/DL (ref 6–20)
CALCIUM UR-MCNC: 10 MG/DL (ref 8.5–10.3)
CHLORIDE SERPL-SCNC: 106 MMOL/L (ref 101–111)
CO2 SERPL-SCNC: 25 MMOL/L (ref 21–32)
CREAT SERPLBLD-SCNC: 0.8 MG/DL (ref 0.4–1)
ERYTHROCYTE [DISTWIDTH] IN BLOOD BY AUTOMATED COUNT: 15.9 % (ref 12–15)
GFRSERPLBLD MDRD-ARVRAT: 72 ML/MIN/{1.73_M2} (ref 89–?)
GLOBULIN SER-MCNC: 3 G/DL (ref 2.1–4.2)
GLUCOSE SERPL-MCNC: 114 MG/DL (ref 70–100)
HCT VFR BLD AUTO: 31.9 % (ref 37–47)
HGB UR QL STRIP: 10.5 G/DL (ref 12–16)
MCH RBC QN AUTO: 30.5 PG (ref 27–31)
MCHC RBC AUTO-ENTMCNC: 32.9 G/DL (ref 32–36)
MCV RBC AUTO: 92.7 FL (ref 81–99)
NEUTROPHILS # SNV AUTO: 9.8 X10^3/UL (ref 4.8–10.8)
PDW BLD AUTO: 9.1 FL (ref 7.9–10.8)
PLATELET # BLD: 306 10^3/UL (ref 130–450)
POTASSIUM SERPL-SCNC: 3.8 MMOL/L (ref 3.5–5)
PROT SPEC-MCNC: 6.7 G/DL (ref 6.7–8.2)
RBC MAR: 3.44 10^6/UL (ref 4.2–5.4)
SODIUM SERPLBLD-SCNC: 140 MMOL/L (ref 135–145)

## 2021-06-25 PROCEDURE — 80053 COMPREHEN METABOLIC PANEL: CPT

## 2021-06-25 PROCEDURE — 85027 COMPLETE CBC AUTOMATED: CPT

## 2021-06-25 PROCEDURE — 36415 COLL VENOUS BLD VENIPUNCTURE: CPT

## 2021-07-03 ENCOUNTER — HOSPITAL ENCOUNTER (OUTPATIENT)
Dept: HOSPITAL 76 - LAB | Age: 66
Discharge: HOME | End: 2021-07-03
Attending: INTERNAL MEDICINE
Payer: MEDICARE

## 2021-07-03 DIAGNOSIS — Z79.899: ICD-10-CM

## 2021-07-03 DIAGNOSIS — M05.10: Primary | ICD-10-CM

## 2021-07-03 LAB
ALBUMIN DIAFP-MCNC: 3.8 G/DL (ref 3.2–5.5)
ALBUMIN/GLOB SERPL: 1.3 {RATIO} (ref 1–2.2)
ALP SERPL-CCNC: 77 IU/L (ref 42–121)
ALT SERPL W P-5'-P-CCNC: 22 IU/L (ref 10–60)
ANION GAP SERPL CALCULATED.4IONS-SCNC: 10 MMOL/L (ref 6–13)
AST SERPL W P-5'-P-CCNC: 23 IU/L (ref 10–42)
BILIRUB BLD-MCNC: 0.6 MG/DL (ref 0.2–1)
BUN SERPL-MCNC: 18 MG/DL (ref 6–20)
CALCIUM UR-MCNC: 9.4 MG/DL (ref 8.5–10.3)
CHLORIDE SERPL-SCNC: 105 MMOL/L (ref 101–111)
CO2 SERPL-SCNC: 23 MMOL/L (ref 21–32)
CREAT SERPLBLD-SCNC: 0.8 MG/DL (ref 0.4–1)
ERYTHROCYTE [DISTWIDTH] IN BLOOD BY AUTOMATED COUNT: 15.8 % (ref 12–15)
GFRSERPLBLD MDRD-ARVRAT: 72 ML/MIN/{1.73_M2} (ref 89–?)
GLOBULIN SER-MCNC: 3 G/DL (ref 2.1–4.2)
GLUCOSE SERPL-MCNC: 70 MG/DL (ref 70–100)
HCT VFR BLD AUTO: 34.9 % (ref 37–47)
HGB UR QL STRIP: 11.4 G/DL (ref 12–16)
MCH RBC QN AUTO: 30.2 PG (ref 27–31)
MCHC RBC AUTO-ENTMCNC: 32.7 G/DL (ref 32–36)
MCV RBC AUTO: 92.3 FL (ref 81–99)
NEUTROPHILS # SNV AUTO: 9 X10^3/UL (ref 4.8–10.8)
PDW BLD AUTO: 9.2 FL (ref 7.9–10.8)
PLATELET # BLD: 351 10^3/UL (ref 130–450)
POTASSIUM SERPL-SCNC: 3.7 MMOL/L (ref 3.5–5)
PROT SPEC-MCNC: 6.8 G/DL (ref 6.7–8.2)
RBC MAR: 3.78 10^6/UL (ref 4.2–5.4)
SODIUM SERPLBLD-SCNC: 138 MMOL/L (ref 135–145)

## 2021-07-03 PROCEDURE — 85027 COMPLETE CBC AUTOMATED: CPT

## 2021-07-03 PROCEDURE — 80053 COMPREHEN METABOLIC PANEL: CPT

## 2021-07-03 PROCEDURE — 36415 COLL VENOUS BLD VENIPUNCTURE: CPT

## 2021-07-16 ENCOUNTER — HOSPITAL ENCOUNTER (OUTPATIENT)
Dept: HOSPITAL 76 - LAB | Age: 66
Discharge: HOME | End: 2021-07-16
Attending: INTERNAL MEDICINE
Payer: MEDICARE

## 2021-07-16 DIAGNOSIS — M05.10: Primary | ICD-10-CM

## 2021-07-16 DIAGNOSIS — Z79.899: ICD-10-CM

## 2021-07-16 LAB
ALBUMIN DIAFP-MCNC: 3.5 G/DL (ref 3.2–5.5)
ALBUMIN/GLOB SERPL: 1.1 {RATIO} (ref 1–2.2)
ALP SERPL-CCNC: 69 IU/L (ref 42–121)
ALT SERPL W P-5'-P-CCNC: 17 IU/L (ref 10–60)
ANION GAP SERPL CALCULATED.4IONS-SCNC: 9 MMOL/L (ref 6–13)
AST SERPL W P-5'-P-CCNC: 18 IU/L (ref 10–42)
BILIRUB BLD-MCNC: 0.2 MG/DL (ref 0.2–1)
BUN SERPL-MCNC: 22 MG/DL (ref 6–20)
CALCIUM UR-MCNC: 9.5 MG/DL (ref 8.5–10.3)
CHLORIDE SERPL-SCNC: 103 MMOL/L (ref 101–111)
CO2 SERPL-SCNC: 25 MMOL/L (ref 21–32)
CREAT SERPLBLD-SCNC: 0.9 MG/DL (ref 0.4–1)
ERYTHROCYTE [DISTWIDTH] IN BLOOD BY AUTOMATED COUNT: 14.8 % (ref 12–15)
GFRSERPLBLD MDRD-ARVRAT: 62 ML/MIN/{1.73_M2} (ref 89–?)
GLOBULIN SER-MCNC: 3.2 G/DL (ref 2.1–4.2)
GLUCOSE SERPL-MCNC: 114 MG/DL (ref 70–100)
HCT VFR BLD AUTO: 35.9 % (ref 37–47)
HGB UR QL STRIP: 11.4 G/DL (ref 12–16)
MCH RBC QN AUTO: 29.3 PG (ref 27–31)
MCHC RBC AUTO-ENTMCNC: 31.8 G/DL (ref 32–36)
MCV RBC AUTO: 92.3 FL (ref 81–99)
NEUTROPHILS # SNV AUTO: 8.3 X10^3/UL (ref 4.8–10.8)
PDW BLD AUTO: 8.7 FL (ref 7.9–10.8)
PLATELET # BLD: 350 10^3/UL (ref 130–450)
POTASSIUM SERPL-SCNC: 3.3 MMOL/L (ref 3.5–5)
PROT SPEC-MCNC: 6.7 G/DL (ref 6.7–8.2)
RBC MAR: 3.89 10^6/UL (ref 4.2–5.4)
SODIUM SERPLBLD-SCNC: 137 MMOL/L (ref 135–145)

## 2021-07-16 PROCEDURE — 80053 COMPREHEN METABOLIC PANEL: CPT

## 2021-07-16 PROCEDURE — 36415 COLL VENOUS BLD VENIPUNCTURE: CPT

## 2021-07-16 PROCEDURE — 85027 COMPLETE CBC AUTOMATED: CPT

## 2021-07-30 ENCOUNTER — HOSPITAL ENCOUNTER (OUTPATIENT)
Dept: HOSPITAL 76 - LAB | Age: 66
Discharge: HOME | End: 2021-07-30
Attending: FAMILY MEDICINE
Payer: MEDICARE

## 2021-07-30 DIAGNOSIS — Z79.899: ICD-10-CM

## 2021-07-30 DIAGNOSIS — D64.9: ICD-10-CM

## 2021-07-30 DIAGNOSIS — M05.10: Primary | ICD-10-CM

## 2021-07-30 LAB
ALBUMIN DIAFP-MCNC: 3.7 G/DL (ref 3.2–5.5)
ALBUMIN/GLOB SERPL: 1.2 {RATIO} (ref 1–2.2)
ALP SERPL-CCNC: 71 IU/L (ref 42–121)
ALT SERPL W P-5'-P-CCNC: 22 IU/L (ref 10–60)
ANION GAP SERPL CALCULATED.4IONS-SCNC: 9 MMOL/L (ref 6–13)
AST SERPL W P-5'-P-CCNC: 20 IU/L (ref 10–42)
BILIRUB BLD-MCNC: 0.6 MG/DL (ref 0.2–1)
BUN SERPL-MCNC: 21 MG/DL (ref 6–20)
CALCIUM UR-MCNC: 9.7 MG/DL (ref 8.5–10.3)
CHLORIDE SERPL-SCNC: 103 MMOL/L (ref 101–111)
CO2 SERPL-SCNC: 25 MMOL/L (ref 21–32)
CREAT SERPLBLD-SCNC: 0.9 MG/DL (ref 0.4–1)
ERYTHROCYTE [DISTWIDTH] IN BLOOD BY AUTOMATED COUNT: 14 % (ref 12–15)
FOLATE SERPL-MCNC: 19.52 NG/ML
GFRSERPLBLD MDRD-ARVRAT: 62 ML/MIN/{1.73_M2} (ref 89–?)
GLOBULIN SER-MCNC: 3.2 G/DL (ref 2.1–4.2)
GLUCOSE SERPL-MCNC: 106 MG/DL (ref 70–100)
HCT VFR BLD AUTO: 34.4 % (ref 37–47)
HGB UR QL STRIP: 11.3 G/DL (ref 12–16)
IRON SATN MFR SERPL: 20 % (ref 20–50)
IRON SERPL-MCNC: 61 UG/DL (ref 28–170)
MCH RBC QN AUTO: 30.4 PG (ref 27–31)
MCHC RBC AUTO-ENTMCNC: 32.8 G/DL (ref 32–36)
MCV RBC AUTO: 92.5 FL (ref 81–99)
NEUTROPHILS # SNV AUTO: 7.5 X10^3/UL (ref 4.8–10.8)
PDW BLD AUTO: 9 FL (ref 7.9–10.8)
PLATELET # BLD: 324 10^3/UL (ref 130–450)
POTASSIUM SERPL-SCNC: 3.4 MMOL/L (ref 3.5–5)
PROT SPEC-MCNC: 6.9 G/DL (ref 6.7–8.2)
RBC MAR: 3.72 10^6/UL (ref 4.2–5.4)
SODIUM SERPLBLD-SCNC: 137 MMOL/L (ref 135–145)
TIBC SERPL-MCNC: 311 UG/DL (ref 250–450)
TRANSFERRIN SERPL-MCNC: 222 MG/DL (ref 192–382)
VIT B12 SERPL-MCNC: 364 PG/ML (ref 180–914)

## 2021-07-30 PROCEDURE — 36415 COLL VENOUS BLD VENIPUNCTURE: CPT

## 2021-07-30 PROCEDURE — 82746 ASSAY OF FOLIC ACID SERUM: CPT

## 2021-07-30 PROCEDURE — 83540 ASSAY OF IRON: CPT

## 2021-07-30 PROCEDURE — 85027 COMPLETE CBC AUTOMATED: CPT

## 2021-07-30 PROCEDURE — 84466 ASSAY OF TRANSFERRIN: CPT

## 2021-07-30 PROCEDURE — 80053 COMPREHEN METABOLIC PANEL: CPT

## 2021-07-30 PROCEDURE — 82607 VITAMIN B-12: CPT

## 2021-08-12 ENCOUNTER — HOSPITAL ENCOUNTER (OUTPATIENT)
Dept: HOSPITAL 76 - LAB | Age: 66
Discharge: HOME | End: 2021-08-12
Attending: FAMILY MEDICINE
Payer: MEDICARE

## 2021-08-12 DIAGNOSIS — E87.6: Primary | ICD-10-CM

## 2021-08-12 LAB
ANION GAP SERPL CALCULATED.4IONS-SCNC: 9 MMOL/L (ref 6–13)
BUN SERPL-MCNC: 17 MG/DL (ref 6–20)
CALCIUM UR-MCNC: 10.4 MG/DL (ref 8.5–10.3)
CHLORIDE SERPL-SCNC: 102 MMOL/L (ref 101–111)
CO2 SERPL-SCNC: 26 MMOL/L (ref 21–32)
CREAT SERPLBLD-SCNC: 0.8 MG/DL (ref 0.4–1)
GFRSERPLBLD MDRD-ARVRAT: 72 ML/MIN/{1.73_M2} (ref 89–?)
GLUCOSE SERPL-MCNC: 91 MG/DL (ref 70–100)
POTASSIUM SERPL-SCNC: 4.1 MMOL/L (ref 3.5–5)
SODIUM SERPLBLD-SCNC: 137 MMOL/L (ref 135–145)

## 2021-08-12 PROCEDURE — 36415 COLL VENOUS BLD VENIPUNCTURE: CPT

## 2021-08-12 PROCEDURE — 80048 BASIC METABOLIC PNL TOTAL CA: CPT

## 2021-08-20 ENCOUNTER — HOSPITAL ENCOUNTER (OUTPATIENT)
Dept: HOSPITAL 76 - LAB | Age: 66
Discharge: HOME | End: 2021-08-20
Attending: INTERNAL MEDICINE
Payer: MEDICARE

## 2021-08-20 DIAGNOSIS — M05.10: Primary | ICD-10-CM

## 2021-08-20 DIAGNOSIS — Z79.899: ICD-10-CM

## 2021-08-20 LAB
ALBUMIN DIAFP-MCNC: 3.9 G/DL (ref 3.2–5.5)
ALBUMIN/GLOB SERPL: 1.3 {RATIO} (ref 1–2.2)
ALP SERPL-CCNC: 80 IU/L (ref 42–121)
ALT SERPL W P-5'-P-CCNC: 24 IU/L (ref 10–60)
ANION GAP SERPL CALCULATED.4IONS-SCNC: 8 MMOL/L (ref 6–13)
AST SERPL W P-5'-P-CCNC: 22 IU/L (ref 10–42)
BILIRUB BLD-MCNC: 0.4 MG/DL (ref 0.2–1)
BUN SERPL-MCNC: 22 MG/DL (ref 6–20)
CALCIUM UR-MCNC: 10 MG/DL (ref 8.5–10.3)
CHLORIDE SERPL-SCNC: 104 MMOL/L (ref 101–111)
CO2 SERPL-SCNC: 27 MMOL/L (ref 21–32)
CREAT SERPLBLD-SCNC: 0.9 MG/DL (ref 0.4–1)
ERYTHROCYTE [DISTWIDTH] IN BLOOD BY AUTOMATED COUNT: 13.2 % (ref 12–15)
GFRSERPLBLD MDRD-ARVRAT: 62 ML/MIN/{1.73_M2} (ref 89–?)
GLOBULIN SER-MCNC: 3 G/DL (ref 2.1–4.2)
GLUCOSE SERPL-MCNC: 112 MG/DL (ref 70–100)
HCT VFR BLD AUTO: 36.1 % (ref 37–47)
HGB UR QL STRIP: 11.5 G/DL (ref 12–16)
MCH RBC QN AUTO: 29.6 PG (ref 27–31)
MCHC RBC AUTO-ENTMCNC: 31.9 G/DL (ref 32–36)
MCV RBC AUTO: 93 FL (ref 81–99)
NEUTROPHILS # SNV AUTO: 10.8 X10^3/UL (ref 4.8–10.8)
PDW BLD AUTO: 9 FL (ref 7.9–10.8)
PLATELET # BLD: 324 10^3/UL (ref 130–450)
POTASSIUM SERPL-SCNC: 4.1 MMOL/L (ref 3.5–5)
PROT SPEC-MCNC: 6.9 G/DL (ref 6.7–8.2)
RBC MAR: 3.88 10^6/UL (ref 4.2–5.4)
SODIUM SERPLBLD-SCNC: 139 MMOL/L (ref 135–145)

## 2021-08-20 PROCEDURE — 36415 COLL VENOUS BLD VENIPUNCTURE: CPT

## 2021-08-20 PROCEDURE — 80053 COMPREHEN METABOLIC PANEL: CPT

## 2021-08-20 PROCEDURE — 85027 COMPLETE CBC AUTOMATED: CPT

## 2021-10-05 ENCOUNTER — HOSPITAL ENCOUNTER (OUTPATIENT)
Dept: HOSPITAL 76 - LAB | Age: 66
Discharge: HOME | End: 2021-10-05
Attending: INTERNAL MEDICINE
Payer: MEDICARE

## 2021-10-05 DIAGNOSIS — M05.10: Primary | ICD-10-CM

## 2021-10-05 DIAGNOSIS — Z79.899: ICD-10-CM

## 2021-10-05 LAB
ALBUMIN DIAFP-MCNC: 4 G/DL (ref 3.2–5.5)
ALBUMIN/GLOB SERPL: 1.3 {RATIO} (ref 1–2.2)
ALP SERPL-CCNC: 69 IU/L (ref 42–121)
ALT SERPL W P-5'-P-CCNC: 22 IU/L (ref 10–60)
ANION GAP SERPL CALCULATED.4IONS-SCNC: 9 MMOL/L (ref 6–13)
AST SERPL W P-5'-P-CCNC: 20 IU/L (ref 10–42)
BILIRUB BLD-MCNC: 0.4 MG/DL (ref 0.2–1)
BUN SERPL-MCNC: 23 MG/DL (ref 6–20)
CALCIUM UR-MCNC: 10.4 MG/DL (ref 8.5–10.3)
CHLORIDE SERPL-SCNC: 99 MMOL/L (ref 101–111)
CO2 SERPL-SCNC: 29 MMOL/L (ref 21–32)
CREAT SERPLBLD-SCNC: 0.9 MG/DL (ref 0.4–1)
ERYTHROCYTE [DISTWIDTH] IN BLOOD BY AUTOMATED COUNT: 13.1 % (ref 12–15)
GFRSERPLBLD MDRD-ARVRAT: 62 ML/MIN/{1.73_M2} (ref 89–?)
GLOBULIN SER-MCNC: 3.1 G/DL (ref 2.1–4.2)
GLUCOSE SERPL-MCNC: 101 MG/DL (ref 70–100)
HCT VFR BLD AUTO: 42.1 % (ref 37–47)
HGB UR QL STRIP: 13.3 G/DL (ref 12–16)
MCH RBC QN AUTO: 28.3 PG (ref 27–31)
MCHC RBC AUTO-ENTMCNC: 31.6 G/DL (ref 32–36)
MCV RBC AUTO: 89.6 FL (ref 81–99)
NEUTROPHILS # SNV AUTO: 8.2 X10^3/UL (ref 4.8–10.8)
PDW BLD AUTO: 8.9 FL (ref 7.9–10.8)
PLATELET # BLD: 331 10^3/UL (ref 130–450)
POTASSIUM SERPL-SCNC: 3.9 MMOL/L (ref 3.5–5)
PROT SPEC-MCNC: 7.1 G/DL (ref 6.7–8.2)
RBC MAR: 4.7 10^6/UL (ref 4.2–5.4)
SODIUM SERPLBLD-SCNC: 137 MMOL/L (ref 135–145)

## 2021-10-05 PROCEDURE — 36415 COLL VENOUS BLD VENIPUNCTURE: CPT

## 2021-10-05 PROCEDURE — 85027 COMPLETE CBC AUTOMATED: CPT

## 2021-10-05 PROCEDURE — 80053 COMPREHEN METABOLIC PANEL: CPT

## 2021-11-09 ENCOUNTER — HOSPITAL ENCOUNTER (OUTPATIENT)
Dept: HOSPITAL 76 - LAB | Age: 66
Discharge: HOME | End: 2021-11-09
Attending: FAMILY MEDICINE
Payer: MEDICARE

## 2021-11-09 DIAGNOSIS — Z13.29: ICD-10-CM

## 2021-11-09 DIAGNOSIS — E78.5: ICD-10-CM

## 2021-11-09 DIAGNOSIS — D64.9: Primary | ICD-10-CM

## 2021-11-09 LAB
BASOPHILS NFR BLD AUTO: 0.1 10^3/UL (ref 0–0.1)
BASOPHILS NFR BLD AUTO: 0.6 %
CHOLEST SERPL-MCNC: 365 MG/DL
EOSINOPHIL # BLD AUTO: 0.1 10^3/UL (ref 0–0.7)
EOSINOPHIL NFR BLD AUTO: 0.9 %
ERYTHROCYTE [DISTWIDTH] IN BLOOD BY AUTOMATED COUNT: 13.9 % (ref 12–15)
HCT VFR BLD AUTO: 42.6 % (ref 37–47)
HDLC SERPL-MCNC: 96 MG/DL
HDLC SERPL: 3.8 {RATIO} (ref ?–4.4)
HGB UR QL STRIP: 13.2 G/DL (ref 12–16)
LDLC SERPL CALC-MCNC: 246 MG/DL
LDLC/HDLC SERPL: 2.6 {RATIO} (ref ?–4.4)
LYMPHOCYTES # SPEC AUTO: 2.5 10^3/UL (ref 1.5–3.5)
LYMPHOCYTES NFR BLD AUTO: 29.4 %
MCH RBC QN AUTO: 27.7 PG (ref 27–31)
MCHC RBC AUTO-ENTMCNC: 31 G/DL (ref 32–36)
MCV RBC AUTO: 89.5 FL (ref 81–99)
MONOCYTES # BLD AUTO: 0.7 10^3/UL (ref 0–1)
MONOCYTES NFR BLD AUTO: 8.2 %
NEUTROPHILS # BLD AUTO: 5.2 10^3/UL (ref 1.5–6.6)
NEUTROPHILS # SNV AUTO: 8.6 X10^3/UL (ref 4.8–10.8)
NEUTROPHILS NFR BLD AUTO: 60.3 %
NRBC # BLD AUTO: 0 /100WBC
NRBC # BLD AUTO: 0 X10^3/UL
PDW BLD AUTO: 8.9 FL (ref 7.9–10.8)
PLATELET # BLD: 320 10^3/UL (ref 130–450)
RBC MAR: 4.76 10^6/UL (ref 4.2–5.4)
TRIGL P FAST SERPL-MCNC: 117 MG/DL
VLDLC SERPL-SCNC: 23 MG/DL

## 2021-11-09 PROCEDURE — 80061 LIPID PANEL: CPT

## 2021-11-09 PROCEDURE — 85025 COMPLETE CBC W/AUTO DIFF WBC: CPT

## 2021-11-09 PROCEDURE — 84443 ASSAY THYROID STIM HORMONE: CPT

## 2021-11-09 PROCEDURE — 83721 ASSAY OF BLOOD LIPOPROTEIN: CPT

## 2021-11-09 PROCEDURE — 36415 COLL VENOUS BLD VENIPUNCTURE: CPT

## 2021-12-09 ENCOUNTER — HOSPITAL ENCOUNTER (OUTPATIENT)
Dept: HOSPITAL 76 - LAB | Age: 66
Discharge: HOME | End: 2021-12-09
Attending: FAMILY MEDICINE
Payer: MEDICARE

## 2021-12-09 DIAGNOSIS — M05.9: Primary | ICD-10-CM

## 2021-12-09 DIAGNOSIS — Z79.899: ICD-10-CM

## 2021-12-09 LAB
ALBUMIN DIAFP-MCNC: 4 G/DL (ref 3.2–5.5)
ALBUMIN/GLOB SERPL: 1.3 {RATIO} (ref 1–2.2)
ALP SERPL-CCNC: 78 IU/L (ref 42–121)
ALT SERPL W P-5'-P-CCNC: 27 IU/L (ref 10–60)
ANION GAP SERPL CALCULATED.4IONS-SCNC: 10 MMOL/L (ref 6–13)
AST SERPL W P-5'-P-CCNC: 25 IU/L (ref 10–42)
BASOPHILS NFR BLD AUTO: 0.1 10^3/UL (ref 0–0.1)
BASOPHILS NFR BLD AUTO: 0.6 %
BILIRUB BLD-MCNC: 0.5 MG/DL (ref 0.2–1)
BUN SERPL-MCNC: 19 MG/DL (ref 6–20)
CALCIUM UR-MCNC: 10 MG/DL (ref 8.5–10.3)
CHLORIDE SERPL-SCNC: 100 MMOL/L (ref 101–111)
CO2 SERPL-SCNC: 28 MMOL/L (ref 21–32)
CREAT SERPLBLD-SCNC: 1 MG/DL (ref 0.4–1)
CRP SERPL-MCNC: < 1 MG/DL (ref 0–1)
EOSINOPHIL # BLD AUTO: 0.1 10^3/UL (ref 0–0.7)
EOSINOPHIL NFR BLD AUTO: 1.5 %
ERYTHROCYTE [DISTWIDTH] IN BLOOD BY AUTOMATED COUNT: 14.2 % (ref 12–15)
GFRSERPLBLD MDRD-ARVRAT: 55 ML/MIN/{1.73_M2} (ref 89–?)
GLOBULIN SER-MCNC: 3 G/DL (ref 2.1–4.2)
GLUCOSE SERPL-MCNC: 87 MG/DL (ref 70–100)
HCT VFR BLD AUTO: 37.5 % (ref 37–47)
HGB UR QL STRIP: 12.4 G/DL (ref 12–16)
LYMPHOCYTES # SPEC AUTO: 2.5 10^3/UL (ref 1.5–3.5)
LYMPHOCYTES NFR BLD AUTO: 28.1 %
MCH RBC QN AUTO: 28.8 PG (ref 27–31)
MCHC RBC AUTO-ENTMCNC: 33.1 G/DL (ref 32–36)
MCV RBC AUTO: 87 FL (ref 81–99)
MONOCYTES # BLD AUTO: 0.9 10^3/UL (ref 0–1)
MONOCYTES NFR BLD AUTO: 10.6 %
NEUTROPHILS # BLD AUTO: 5.1 10^3/UL (ref 1.5–6.6)
NEUTROPHILS # SNV AUTO: 8.8 X10^3/UL (ref 4.8–10.8)
NEUTROPHILS NFR BLD AUTO: 58.6 %
NRBC # BLD AUTO: 0 /100WBC
NRBC # BLD AUTO: 0 X10^3/UL
PDW BLD AUTO: 9.3 FL (ref 7.9–10.8)
PLATELET # BLD: 292 10^3/UL (ref 130–450)
POTASSIUM SERPL-SCNC: 3.8 MMOL/L (ref 3.5–5)
PROT SPEC-MCNC: 7 G/DL (ref 6.7–8.2)
RBC MAR: 4.31 10^6/UL (ref 4.2–5.4)
SODIUM SERPLBLD-SCNC: 138 MMOL/L (ref 135–145)

## 2021-12-09 PROCEDURE — 85651 RBC SED RATE NONAUTOMATED: CPT

## 2021-12-09 PROCEDURE — 80053 COMPREHEN METABOLIC PANEL: CPT

## 2021-12-09 PROCEDURE — 85025 COMPLETE CBC W/AUTO DIFF WBC: CPT

## 2021-12-09 PROCEDURE — 86140 C-REACTIVE PROTEIN: CPT

## 2021-12-09 PROCEDURE — 86480 TB TEST CELL IMMUN MEASURE: CPT

## 2021-12-09 PROCEDURE — 36415 COLL VENOUS BLD VENIPUNCTURE: CPT

## 2021-12-11 LAB
MITOGEN-NIL: 8.99 IU/ML
NIL: 0.03 IU/ML
TB1-NIL: 0.01 IU/ML
TB2-NIL: 0 IU/ML

## 2022-02-09 ENCOUNTER — HOSPITAL ENCOUNTER (OUTPATIENT)
Dept: HOSPITAL 76 - LAB | Age: 67
Discharge: HOME | End: 2022-02-09
Attending: FAMILY MEDICINE
Payer: MEDICARE

## 2022-02-09 DIAGNOSIS — E78.5: ICD-10-CM

## 2022-02-09 DIAGNOSIS — D64.9: Primary | ICD-10-CM

## 2022-02-09 LAB
BASOPHILS NFR BLD AUTO: 0.1 10^3/UL (ref 0–0.1)
BASOPHILS NFR BLD AUTO: 0.8 %
CHOLEST SERPL-MCNC: 194 MG/DL
EOSINOPHIL # BLD AUTO: 0.2 10^3/UL (ref 0–0.7)
EOSINOPHIL NFR BLD AUTO: 2.3 %
ERYTHROCYTE [DISTWIDTH] IN BLOOD BY AUTOMATED COUNT: 13.5 % (ref 12–15)
HCT VFR BLD AUTO: 41.2 % (ref 37–47)
HDLC SERPL-MCNC: 95 MG/DL
HDLC SERPL: 2 {RATIO} (ref ?–4.4)
HGB UR QL STRIP: 13.2 G/DL (ref 12–16)
LDLC SERPL CALC-MCNC: 86 MG/DL
LDLC/HDLC SERPL: 0.9 {RATIO} (ref ?–4.4)
LYMPHOCYTES # SPEC AUTO: 2.2 10^3/UL (ref 1.5–3.5)
LYMPHOCYTES NFR BLD AUTO: 26.4 %
MCH RBC QN AUTO: 29.1 PG (ref 27–31)
MCHC RBC AUTO-ENTMCNC: 32 G/DL (ref 32–36)
MCV RBC AUTO: 90.7 FL (ref 81–99)
MONOCYTES # BLD AUTO: 0.8 10^3/UL (ref 0–1)
MONOCYTES NFR BLD AUTO: 9.4 %
NEUTROPHILS # BLD AUTO: 5.1 10^3/UL (ref 1.5–6.6)
NEUTROPHILS # SNV AUTO: 8.3 X10^3/UL (ref 4.8–10.8)
NEUTROPHILS NFR BLD AUTO: 60.6 %
NRBC # BLD AUTO: 0 /100WBC
NRBC # BLD AUTO: 0 X10^3/UL
PDW BLD AUTO: 9.2 FL (ref 7.9–10.8)
PLATELET # BLD: 301 10^3/UL (ref 130–450)
RBC MAR: 4.54 10^6/UL (ref 4.2–5.4)
TRIGL P FAST SERPL-MCNC: 67 MG/DL
VLDLC SERPL-SCNC: 13 MG/DL

## 2022-02-09 PROCEDURE — 36415 COLL VENOUS BLD VENIPUNCTURE: CPT

## 2022-02-09 PROCEDURE — 80061 LIPID PANEL: CPT

## 2022-02-09 PROCEDURE — 83721 ASSAY OF BLOOD LIPOPROTEIN: CPT

## 2022-02-09 PROCEDURE — 85025 COMPLETE CBC W/AUTO DIFF WBC: CPT

## 2022-12-13 ENCOUNTER — HOSPITAL ENCOUNTER (OUTPATIENT)
Dept: HOSPITAL 76 - LAB | Age: 67
Discharge: HOME | End: 2022-12-13
Attending: INTERNAL MEDICINE
Payer: MEDICARE

## 2022-12-13 DIAGNOSIS — M05.9: Primary | ICD-10-CM

## 2022-12-13 DIAGNOSIS — Z79.60: ICD-10-CM

## 2022-12-13 LAB
ALBUMIN DIAFP-MCNC: 3.8 G/DL (ref 3.2–5.5)
ALBUMIN/GLOB SERPL: 1.3 {RATIO} (ref 1–2.2)
ALP SERPL-CCNC: 89 IU/L (ref 42–121)
ALT SERPL W P-5'-P-CCNC: 27 IU/L (ref 10–60)
ANION GAP SERPL CALCULATED.4IONS-SCNC: 8 MMOL/L (ref 6–13)
AST SERPL W P-5'-P-CCNC: 24 IU/L (ref 10–42)
BASOPHILS NFR BLD AUTO: 0.1 10^3/UL (ref 0–0.1)
BASOPHILS NFR BLD AUTO: 0.5 %
BILIRUB BLD-MCNC: 0.5 MG/DL (ref 0.2–1)
BUN SERPL-MCNC: 20 MG/DL (ref 6–20)
CALCIUM UR-MCNC: 10.1 MG/DL (ref 8.5–10.3)
CHLORIDE SERPL-SCNC: 102 MMOL/L (ref 101–111)
CO2 SERPL-SCNC: 28 MMOL/L (ref 21–32)
CREAT SERPLBLD-SCNC: 0.8 MG/DL (ref 0.4–1)
CRP SERPL-MCNC: 2 MG/DL (ref 0–1)
EOSINOPHIL # BLD AUTO: 0.1 10^3/UL (ref 0–0.7)
EOSINOPHIL NFR BLD AUTO: 0.9 %
ERYTHROCYTE [DISTWIDTH] IN BLOOD BY AUTOMATED COUNT: 13.4 % (ref 12–15)
GFRSERPLBLD MDRD-ARVRAT: 71 ML/MIN/{1.73_M2} (ref 89–?)
GLOBULIN SER-MCNC: 3 G/DL (ref 2.1–4.2)
GLUCOSE SERPL-MCNC: 140 MG/DL (ref 70–100)
HCT VFR BLD AUTO: 40.9 % (ref 37–47)
HGB UR QL STRIP: 12.9 G/DL (ref 12–16)
LYMPHOCYTES # SPEC AUTO: 2.6 10^3/UL (ref 1.5–3.5)
LYMPHOCYTES NFR BLD AUTO: 25.9 %
MCH RBC QN AUTO: 29.3 PG (ref 27–31)
MCHC RBC AUTO-ENTMCNC: 31.5 G/DL (ref 32–36)
MCV RBC AUTO: 92.7 FL (ref 81–99)
MONOCYTES # BLD AUTO: 0.8 10^3/UL (ref 0–1)
MONOCYTES NFR BLD AUTO: 7.6 %
NEUTROPHILS # BLD AUTO: 6.6 10^3/UL (ref 1.5–6.6)
NEUTROPHILS # SNV AUTO: 10.2 X10^3/UL (ref 4.8–10.8)
NEUTROPHILS NFR BLD AUTO: 64.5 %
NRBC # BLD AUTO: 0 /100WBC
NRBC # BLD AUTO: 0 X10^3/UL
PDW BLD AUTO: 9.1 FL (ref 7.9–10.8)
PLATELET # BLD: 310 10^3/UL (ref 130–450)
POTASSIUM SERPL-SCNC: 3.5 MMOL/L (ref 3.5–5)
PROT SPEC-MCNC: 6.8 G/DL (ref 6.7–8.2)
RBC MAR: 4.41 10^6/UL (ref 4.2–5.4)
SODIUM SERPLBLD-SCNC: 138 MMOL/L (ref 135–145)

## 2022-12-13 PROCEDURE — 85651 RBC SED RATE NONAUTOMATED: CPT

## 2022-12-13 PROCEDURE — 86140 C-REACTIVE PROTEIN: CPT

## 2022-12-13 PROCEDURE — 80053 COMPREHEN METABOLIC PANEL: CPT

## 2022-12-13 PROCEDURE — 36415 COLL VENOUS BLD VENIPUNCTURE: CPT

## 2022-12-13 PROCEDURE — 85025 COMPLETE CBC W/AUTO DIFF WBC: CPT

## 2023-05-01 ENCOUNTER — HOSPITAL ENCOUNTER (OUTPATIENT)
Dept: HOSPITAL 76 - LAB | Age: 68
Discharge: HOME | End: 2023-05-01
Attending: INTERNAL MEDICINE
Payer: MEDICARE

## 2023-05-01 DIAGNOSIS — Z79.60: ICD-10-CM

## 2023-05-01 DIAGNOSIS — M05.9: Primary | ICD-10-CM

## 2023-05-01 LAB
ALBUMIN DIAFP-MCNC: 3.8 G/DL (ref 3.2–5.5)
ALBUMIN/GLOB SERPL: 1.4 {RATIO} (ref 1–2.2)
ALP SERPL-CCNC: 94 IU/L (ref 42–121)
ALT SERPL W P-5'-P-CCNC: 28 IU/L (ref 10–60)
ANION GAP SERPL CALCULATED.4IONS-SCNC: 4 MMOL/L (ref 6–13)
AST SERPL W P-5'-P-CCNC: 25 IU/L (ref 10–42)
BASOPHILS NFR BLD AUTO: 0.1 10^3/UL (ref 0–0.1)
BASOPHILS NFR BLD AUTO: 0.8 %
BILIRUB BLD-MCNC: 0.6 MG/DL (ref 0.2–1)
BUN SERPL-MCNC: 15 MG/DL (ref 6–20)
CALCIUM UR-MCNC: 9.6 MG/DL (ref 8.5–10.3)
CHLORIDE SERPL-SCNC: 106 MMOL/L (ref 101–111)
CO2 SERPL-SCNC: 27 MMOL/L (ref 21–32)
CREAT SERPLBLD-SCNC: 0.9 MG/DL (ref 0.4–1)
CRP SERPL-MCNC: < 1 MG/DL (ref 0–1)
EOSINOPHIL # BLD AUTO: 0.1 10^3/UL (ref 0–0.7)
EOSINOPHIL NFR BLD AUTO: 0.8 %
ERYTHROCYTE [DISTWIDTH] IN BLOOD BY AUTOMATED COUNT: 13.6 % (ref 12–15)
GFRSERPLBLD MDRD-ARVRAT: 62 ML/MIN/{1.73_M2} (ref 89–?)
GLOBULIN SER-MCNC: 2.7 G/DL (ref 2.1–4.2)
GLUCOSE SERPL-MCNC: 90 MG/DL (ref 70–100)
HCT VFR BLD AUTO: 40.6 % (ref 37–47)
HGB UR QL STRIP: 12.7 G/DL (ref 12–16)
LYMPHOCYTES # SPEC AUTO: 2.3 10^3/UL (ref 1.5–3.5)
LYMPHOCYTES NFR BLD AUTO: 22 %
MCH RBC QN AUTO: 28.9 PG (ref 27–31)
MCHC RBC AUTO-ENTMCNC: 31.3 G/DL (ref 32–36)
MCV RBC AUTO: 92.5 FL (ref 81–99)
MONOCYTES # BLD AUTO: 1 10^3/UL (ref 0–1)
MONOCYTES NFR BLD AUTO: 9.6 %
NEUTROPHILS # BLD AUTO: 6.8 10^3/UL (ref 1.5–6.6)
NEUTROPHILS # SNV AUTO: 10.3 X10^3/UL (ref 4.8–10.8)
NEUTROPHILS NFR BLD AUTO: 66.4 %
NRBC # BLD AUTO: 0 /100WBC
NRBC # BLD AUTO: 0 X10^3/UL
PDW BLD AUTO: 9.2 FL (ref 7.9–10.8)
PLATELET # BLD: 328 10^3/UL (ref 130–450)
POTASSIUM SERPL-SCNC: 3.3 MMOL/L (ref 3.5–5)
PROT SPEC-MCNC: 6.5 G/DL (ref 6.7–8.2)
RBC MAR: 4.39 10^6/UL (ref 4.2–5.4)
SODIUM SERPLBLD-SCNC: 137 MMOL/L (ref 135–145)

## 2023-05-01 PROCEDURE — 85651 RBC SED RATE NONAUTOMATED: CPT

## 2023-05-01 PROCEDURE — 80053 COMPREHEN METABOLIC PANEL: CPT

## 2023-05-01 PROCEDURE — 85025 COMPLETE CBC W/AUTO DIFF WBC: CPT

## 2023-05-01 PROCEDURE — 36415 COLL VENOUS BLD VENIPUNCTURE: CPT

## 2023-05-01 PROCEDURE — 86140 C-REACTIVE PROTEIN: CPT
